# Patient Record
(demographics unavailable — no encounter records)

---
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
## 2024-12-20 NOTE — ASSESSMENT
[FreeTextEntry1] : 66 year old female with multinodular goiter s/p benign FNA of right mid pole and right lower pole thyroid nodules in 2015, Hyperlipidemia, HTN and now type 2 diabetes.   1. MNG- referall due to Dr Charlton to discuss large thyroid nodules, possible surgical plan  2.[id number]  mg daily Restart Mounjaro 2.5 mg weekly with goal of titrating up Continue to monitor blood sugars - fastings meeting goal Continue to watch diet/exercise as tolerated   3. Hx pituitary adenoma- No residual tumor seen on prior imaging and no hormonal abnormalities in the past.  4. Vit D Def- Levels to be checked with labs, continue weekly supplement  5. HLD-need updated lipid panel, on statin  Continue to plan to follow with neurology for facial numbness of unknown origin  RTO 2  months NP , 4 months MD, labs due now

## 2024-12-20 NOTE — ASSESSMENT
[FreeTextEntry1] : 66 year old female with multinodular goiter s/p benign FNA of right mid pole and right lower pole thyroid nodules in 2015, Hyperlipidemia, HTN and now type 2 diabetes.   1. MNG- referall due to Dr Charlton to discuss large thyroid nodules, possible surgical plan  2.[id number]  mg daily Restart Mounjaro 2.5 mg weekly with goal of titrating up Continue to monitor blood sugars - fastings meeting goal Continue to watch diet/exercise as tolerated   3. Hx pituitary adenoma- No residual tumor seen on prior imaging and no hormonal abnormalities in the past.  4. Vit D Def- Levels to be checked with labs, continue weekly supplement  5. HLD-need updated lipid panel, on statin  Continue to plan to follow with neurology for facial numbness of unknown origin  RTO 2  months NP , 4 months MD, labs due now
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
## 2024-12-20 NOTE — REVIEW OF SYSTEMS
[Neck Pain] : neck pain [Fatigue] : no fatigue [Recent Weight Gain (___ Lbs)] : no recent weight gain [Recent Weight Loss (___ Lbs)] : no recent weight loss [Visual Field Defect] : no visual field defect [Blurred Vision] : no blurred vision [Dysphagia] : no dysphagia [Dysphonia] : no dysphonia [Chest Pain] : no chest pain [Shortness Of Breath] : no shortness of breath [Constipation] : no constipation [Diarrhea] : no diarrhea [Polyuria] : no polyuria [Polydipsia] : no polydipsia [de-identified] : facial numbness

## 2024-12-20 NOTE — REVIEW OF SYSTEMS
[Neck Pain] : neck pain [Fatigue] : no fatigue [Recent Weight Gain (___ Lbs)] : no recent weight gain [Recent Weight Loss (___ Lbs)] : no recent weight loss [Visual Field Defect] : no visual field defect [Blurred Vision] : no blurred vision [Dysphagia] : no dysphagia [Dysphonia] : no dysphonia [Chest Pain] : no chest pain [Shortness Of Breath] : no shortness of breath [Constipation] : no constipation [Diarrhea] : no diarrhea [Polyuria] : no polyuria [Polydipsia] : no polydipsia [de-identified] : facial numbness

## 2025-01-02 NOTE — HISTORY OF PRESENT ILLNESS
[FreeTextEntry1] : Nathalie Petersen is a 67 year old female with medical history significant for T2DM, HLD, s/p transsphenoidal pituitary tumor resection 2004 presenting today referred by ENT for R facial pain, accompanied by family.   She was seen at [facility] 11/27/24 for R facial pain, discharged on carbamazepine 200 mg 1 tab BID for 2 weeks and Percocet 5/325. She was also evaluated by ENT Dr. Charlton who referred her here.  She endorses right-sided facial numbness and pain that began in summer 2024.  There is no preceding injury or trauma or event. Pain is constant, no symptom free time. Anything that touches her R cheek will worsen the pain.  It will also get worse with chewing or speaking for prolonged amount of time.  She initially thought it was a dental issue, they pulled the tooth on that side but the pain still continued.  The carbamazepine helped for the 2 weeks she was on it, however was not able to get a refill. She denies any previous side effects to medications. She states the Percocet for very intense pain, take it every day.  No other neurologic complaints.  She has report from stand-up MRI, imaging disc not available for review.  MRI brain w/w/o 10/24/24 - appearance compatible with prior pituitary surgery.

## 2025-01-02 NOTE — HISTORY OF PRESENT ILLNESS
[FreeTextEntry1] : Nathalie Petersen is a 67 year old female with medical history significant for T2DM, HLD, s/p transsphenoidal pituitary tumor resection 2004 presenting today referred by ENT for R facial pain, accompanied by family.   She was seen at [facility] 11/27/24 for R facial pain, discharged on carbamazepine 200 mg 1 tab BID for 2 weeks and Percocet 5/325. She was also evaluated by ENT Dr. Charlton who referred her here.  She endorses right-sided facial numbness and pain that began in summer 2024.  There is no preceding injury or trauma or event. Pain is constant, no symptom free time. Anything that touches her R cheek will worsen the pain.  It will also get worse with chewing or speaking for prolonged amount of time.  She initially thought it was a dental issue, they pulled the tooth on that side but the pain still continued.  The carbamazepine helped for the 2 weeks she was on it, however was not able to get a refill. She denies any previous side effects to medications. She states the Percocet for very intense pain, take it every day.  No other neurologic complaints.  She has report from stand-up MRI, imaging disc not available for review.  MRI brain w/w/o 10/24/24 - appearance compatible with prior pituitary surgery.

## 2025-01-02 NOTE — ASSESSMENT
[FreeTextEntry1] : 67 year old female with medical history significant for T2DM, HLD, s/p transsphenoidal pituitary tumor resection 2004 presenting today referred by ENT for R facial pain, accompanied by family. R facial pain onset summer 2024, constant and worsened with touch, chewing, or speaking. Some relief with carbamazepine. Physical exam with R V2 hyperesthesia. Pt tearful on exam.  Based on clinical history and presentation patient with likely trigeminal neuralgia  Recommendations:  - MRI head w/w/o with attention to trigeminal nerve and MRA head to r/o neurovascular compression - start carbamazepine 200 mg BID for neuropathic pain - pt to call in 1 month to discuss tolerability and consider increasing dose - side effects discussed - pt and family counseled to d/c if any new rash or skin lesion - referral to [facility] specialist Dr. Butt   Patient to return to office in 3 months, or sooner if needed. Patient understands to seek urgent medical evaluation for any new or worsening symptoms.

## 2025-01-02 NOTE — PHYSICAL EXAM
[FreeTextEntry1] : NEURO: Mental Status Oriented to person, place, time, and situation Speech is clear, fluent, and spontaneous. Comprehension and memory intact   Cranial Nerves Visual fields full to confrontation Pupils equal, round, reactive to light. Extraocular movements intact. No nystagmus or ptosis. Facial sensation asymmetric, +hyperesthesia R V2 Facial movement intact and symmetric Uvula midline, soft palate elevates normally Bilateral shoulder shrug intact Tongue midline   Motor Tone and bulk intact Shoulder abduction: 5/5 b/l Elbow flexion/extension: 5/5 bl Hand : 5/5 b/l Hip flexion/extension: 5/5 b/l No pronator drift   Sensation Light touch grossly intact   Coordination Normal finger to nose bilaterally No past pointing   Gait Normal stance, stride, and pivot turn Negative Romberg.     GEN: awake, alert, interactive. tearful. EYES: sclera white, conjunctiva clear, no redness or discharge ENT: normal appearing outer ears, hearing grossly intact PULM: normal respiratory rhythm and effort, speaking in full sentences without distress, no accessory muscle usage EXT: moving all extremities spontaneously, no edema, no cyanosis SKIN: warm, dry

## 2025-02-10 NOTE — REVIEW OF SYSTEMS
[Fatigue] : no fatigue [Decreased Appetite] : decreased appetite [Recent Weight Gain (___ Lbs)] : no recent weight gain [Recent Weight Loss (___ Lbs)] : recent weight loss: [unfilled] lbs [Visual Field Defect] : no visual field defect [Blurred Vision] : no blurred vision [Dysphagia] : no dysphagia [Neck Pain] : no neck pain [Dysphonia] : no dysphonia [Chest Pain] : no chest pain [Shortness Of Breath] : no shortness of breath [Constipation] : no constipation [Diarrhea] : no diarrhea [Polyuria] : no polyuria [Polydipsia] : no polydipsia [FreeTextEntry4] : +facial pain

## 2025-02-10 NOTE — PHYSICAL EXAM
[Alert] : alert [Well Nourished] : well nourished [No Acute Distress] : no acute distress [Normal Sclera/Conjunctiva] : normal sclera/conjunctiva [Normal Hearing] : hearing was normal [Thyroid Not Enlarged] : the thyroid was not enlarged [No Accessory Muscle Use] : no accessory muscle use [Clear to Auscultation] : lungs were clear to auscultation bilaterally [Normal S1, S2] : normal S1 and S2 [Normal Rate] : heart rate was normal [No Edema] : no peripheral edema [Not Tender] : non-tender [Soft] : abdomen soft [Normal Gait] : normal gait [No Rash] : no rash [No Tremors] : no tremors [Oriented x3] : oriented to person, place, and time [de-identified] : palpable cyst like strcuture under right lobe
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
## 2025-02-10 NOTE — ASSESSMENT
[FreeTextEntry1] : 67 year old female with multinodular goiter s/p benign FNA of right mid pole and right lower pole thyroid nodules in 2015, Hyperlipidemia, HTN and now type 2 diabetes.   1. MNG- s/p another benign FNA of largest nodule. No compressive symptoms. Will conitnue annual thyroid sonogram monitoring  2.[id number]  mg daily Continue Mounjaro 5 mg weekly Must add more protein to diet  Continue to monitor blood sugars - fastings meeting goal Continue to watch diet/exercise as tolerated   3. Hx pituitary adenoma- No residual tumor seen on prior imaging and no hormonal abnormalities in the past.  4. Vit D Def- Continue weekly supplement  5. HLD-Lipid panel acceptable , on statin  Continue to plan to follow with neurology for trigeminal neuralgia   RTO 3 months MD, labs due before that visit

## 2025-04-14 NOTE — HISTORY OF PRESENT ILLNESS
[FreeTextEntry1] : 67-year-old woman history of pituitary macroadenoma status post resection in 2004, right-sided trigeminal neuralgia here for follow-up.  Patient was last seen in December by Paulette BARKER. She still has pain on the right side, can have bad days. She has to take her time washing her face. MRA did not show any loop compression of the trigeminal nerve. If she misses a pill of the carbamazepine the pain is worse. Currently on carbamazepine 200 mg twice a day with occasionally three times a day. She denies any side effects.    Prior HPI: Nathalie Petersen is a 67 year old female with medical history significant for T2DM, HLD, s/p transsphenoidal pituitary tumor resection 2004 presenting today referred by ENT for R facial pain, accompanied by family.   She was seen at [facility] 11/27/24 for R facial pain, discharged on carbamazepine 200 mg 1 tab BID for 2 weeks and Percocet 5/325. She was also evaluated by ENT Dr. Charlton who referred her here.  She endorses right-sided facial numbness and pain that began in summer 2024.  There is no preceding injury or trauma or event. Pain is constant, no symptom free time. Anything that touches her R cheek will worsen the pain.  It will also get worse with chewing or speaking for prolonged amount of time.  She initially thought it was a dental issue, they pulled the tooth on that side but the pain still continued.  The carbamazepine helped for the 2 weeks she was on it, however was not able to get a refill. She denies any previous side effects to medications. She states the Percocet for very intense pain, take it every day.  No other neurologic complaints.  She has report from stand-up MRI, imaging disc not available for review.  MRI brain w/w/o 10/24/24 - appearance compatible with prior pituitary surgery.

## 2025-04-14 NOTE — PHYSICAL EXAM
[FreeTextEntry1] :     GENERAL PHYSICAL EXAM: GEN: no acute distress, normal affect EYES:  sclera white, conjunctiva clear PULM: no respiratory distress, normal rate EXT: no edema, no cyanosis Cranial:  no supraorbital, temporal, or occipital region tenderness.  SKIN: warm, dry, no rash or lesion on exposed skin   NEUROLOGICAL EXAM: Mental Status Orientation: alert and oriented to person, place, time, and situation Language: clear and fluent, intact comprehension and repetition   Cranial Nerves II: visual fields full to confrontation III, IV, VI:  PERRL, EOMI, VFF, no nystagmus, no ptosis V, VII: facial sensation and movement intact and symmetric VIII: hearing intact to finger rub bilat IX, X: uvula midline, soft palate elevates normally XI: BL shoulder shrug intact, lateral head movement 5/5 bilat XII: tongue midline   Motor: Bilateral muscle strength 5/5 in UE and LE, proximally and distally, symmetric throughout Tone and bulk are normal in upper and lower limbs. No abnormal movements   Sensation: Intact to light touch and temp in all 4 EXTs,   Coordination: Normal FNF bilateral,    Reflex: 2+ in BL biceps, brachioradialis, triceps, patella, Achilles. Babinski absent bilat   Gait Normal stance, stride, and pivot turn,

## 2025-04-14 NOTE — ASSESSMENT
[FreeTextEntry1] : 67 year old female with medical history significant for T2DM, HLD, s/p transsphenoidal pituitary tumor resection 2004, here for follow up of right side trigeminal neuralgia R facial pain onset summer 2024, constant and worsened with touch, chewing, or speaking. Some relief with carbamazepine. MRI and MRA were unremarkable   Based on clinical history and presentation patient with likely trigeminal neuralgia  Recommendations:  -increase carbamazepine to 400mg BID.  - check BMP at next visit - side effects discussed    Patient to return to office in 3 months, or sooner if needed. Patient understands to seek urgent medical evaluation for any new or worsening symptoms.  All questions answered to the best of my knowledge.

## 2025-04-16 NOTE — REASON FOR VISIT
[Follow - Up] : a follow-up visit [DM Type 2] : DM Type 2 [Pituitary Evaluation/ Disorder] : pituitary evaluation/disorder [Thyroid nodule/ MNG] : thyroid nodule/ MNG [Weight Management/Obesity] : weight management/obesity

## 2025-04-16 NOTE — HISTORY OF PRESENT ILLNESS
[FreeTextEntry1] : 67 y.o. Female with Hx of Pituitary Macroadenoma, s/p resection in 2004, MNG, follows for DM management and MNG. Previous patient of Dr. SUSIE Singh

## 2025-04-16 NOTE — PHYSICAL EXAM
[Alert] : alert [Obese] : obese [No Acute Distress] : no acute distress [Well Developed] : well developed [Normal Voice/Communication] : normal voice communication [PERRL] : pupils equal, round and reactive to light [Normal Hearing] : hearing was normal [No Neck Mass] : no neck mass was observed [Thyroid Not Enlarged] : the thyroid was not enlarged [No Respiratory Distress] : no respiratory distress [Clear to Auscultation] : lungs were clear to auscultation bilaterally [Normal Rate] : heart rate was normal [Regular Rhythm] : with a regular rhythm [No Edema] : no peripheral edema [Normal Bowel Sounds] : normal bowel sounds [Soft] : abdomen soft [Normal Supraclavicular Nodes] : no supraclavicular lymphadenopathy [Normal Anterior Cervical Nodes] : no anterior cervical lymphadenopathy [No Clubbing, Cyanosis] : no clubbing  or cyanosis of the fingernails [Normal Reflexes] : deep tendon reflexes were 2+ and symmetric [No Tremors] : no tremors [Oriented x3] : oriented to person, place, and time [Normal Affect] : the affect was normal [Normal Mood] : the mood was normal [de-identified] : RMP nodule palpated  [de-identified] : Systolic murmur [de-identified] : Obese

## 2025-04-16 NOTE — ASSESSMENT
[FreeTextEntry1] : 67 y.o. Female with Hx of Pituitary Macroadenoma, s/p resection in 2004, MNG, follows for DM management and MNG. Previous patient of Dr. SUSIE Singh  Patient here for follow up. She struggles from R. Trigeminal neuralgia and recently saw neurologist. Now with increased Carbamazepine  Currently on: Metformin 500 mg daily Mounjaro 5 mg weekly  # Controlled T2D A1C remains in pre DM range 5.8%<==6.1% Continue current regimen  # Obesity BMI 32 Lost 6-8Lb for the last 6 months.   # HLD Lipids acceptable Continue current Rosuvastatin Continue low fat/cholesterol diet  # MNG S/p repeatedly benign FNA of RMP nodule In 2015 by Dr. Singh on RMP 2 cm and RLP 1.2 cm both benign Also, she reports another benign FNA in January 2025 but I don't see report Last US in 11/2024 Repeat US in 11/2025  # Hx of Pituitary adenoma S/p resection 2004 with Dr. Sevilla at [facility] Patient is asymptomatic Reportedly previous hormonal W/U was normal Continue monitoring  F/u in 6 months.